# Patient Record
Sex: FEMALE
[De-identification: names, ages, dates, MRNs, and addresses within clinical notes are randomized per-mention and may not be internally consistent; named-entity substitution may affect disease eponyms.]

---

## 2024-08-08 ENCOUNTER — APPOINTMENT (OUTPATIENT)
Dept: OBGYN | Facility: CLINIC | Age: 29
End: 2024-08-08

## 2024-08-08 ENCOUNTER — LABORATORY RESULT (OUTPATIENT)
Age: 29
End: 2024-08-08

## 2024-08-08 PROBLEM — Z82.49 FAMILY HISTORY OF HYPERTENSION: Status: ACTIVE | Noted: 2024-08-08

## 2024-08-08 PROBLEM — Z86.19 HISTORY OF HERPES SIMPLEX TYPE 2 INFECTION: Status: RESOLVED | Noted: 2024-08-08 | Resolved: 2024-08-08

## 2024-08-08 PROBLEM — Z00.00 ENCOUNTER FOR PREVENTIVE HEALTH EXAMINATION: Status: ACTIVE | Noted: 2024-08-08

## 2024-08-08 PROBLEM — Z78.9 NON-SMOKER: Status: ACTIVE | Noted: 2024-08-08

## 2024-08-08 PROBLEM — Z12.4 CERVICAL CANCER SCREENING: Status: ACTIVE | Noted: 2024-08-08

## 2024-08-08 PROBLEM — Z80.3 FAMILY HISTORY OF MALIGNANT NEOPLASM OF FEMALE BREAST: Status: ACTIVE | Noted: 2024-08-08

## 2024-08-08 PROBLEM — N32.81 OVERACTIVE BLADDER: Status: RESOLVED | Noted: 2024-08-08 | Resolved: 2024-08-08

## 2024-08-08 PROBLEM — Z11.3 VENEREAL DISEASE SCREENING: Status: ACTIVE | Noted: 2024-08-08

## 2024-08-08 PROBLEM — Z30.42 ENCOUNTER FOR SURVEILLANCE OF INJECTABLE CONTRACEPTIVE: Status: ACTIVE | Noted: 2024-08-08

## 2024-08-08 PROBLEM — Z01.419 ENCOUNTER FOR GYNECOLOGICAL EXAMINATION WITHOUT ABNORMAL FINDING: Status: ACTIVE | Noted: 2024-08-08 | Resolved: 2024-08-22

## 2024-08-08 PROBLEM — Z87.19 HISTORY OF GASTROESOPHAGEAL REFLUX (GERD): Status: RESOLVED | Noted: 2024-08-08 | Resolved: 2024-08-08

## 2024-08-08 PROBLEM — Z78.9 CONSUMES ALCOHOL OCCASIONALLY: Status: ACTIVE | Noted: 2024-08-08

## 2024-08-08 PROBLEM — Z80.0 FAMILY HISTORY OF MALIGNANT NEOPLASM OF COLON: Status: ACTIVE | Noted: 2024-08-08

## 2024-08-08 PROBLEM — R87.619 ABNORMAL CERVICAL PAPANICOLAOU SMEAR, UNSPECIFIED ABNORMAL PAP FINDING: Status: ACTIVE | Noted: 2024-08-08

## 2024-08-08 PROCEDURE — 99385 PREV VISIT NEW AGE 18-39: CPT | Mod: 25

## 2024-08-08 PROCEDURE — 96372 THER/PROPH/DIAG INJ SC/IM: CPT

## 2024-08-08 NOTE — PHYSICAL EXAM
[Appropriately responsive] : appropriately responsive [Alert] : alert [No Acute Distress] : no acute distress [Regular Rate Rhythm] : regular rate rhythm [Soft] : soft [Non-tender] : non-tender [Non-distended] : non-distended [No HSM] : No HSM [No Lesions] : no lesions [No Mass] : no mass [Oriented x3] : oriented x3 [Examination Of The Breasts] : a normal appearance [Normal] : normal [No Masses] : no breast masses were palpable [FreeTextEntry5] : Respirations even, unlabored. no dyspnea [FreeTextEntry1] : Labia/Clitoris: Normal, no lesions. Vagina: Normal, no lesions visible or palpable. no abnormal discharge Cervix: Smooth, pink, no lesions. No cervical motion tenderness. PAP collected  Uterus: normal size and position mobile, nontender.  Adnexa: No palpable adnexal masses, nontender bilaterally.

## 2024-08-08 NOTE — HISTORY OF PRESENT ILLNESS
[N] : Patient denies prior pregnancies [Currently Active] : currently active [Men] : men [Yes] : Yes [DepoProvera] : uses depo-medroxyprogesterone [Y] : Patient is sexually active [Monogamous (Male Partner)] : is monogamous with a male partner [FreeTextEntry1] : 28 year old female is here for Annual gyn exam; patient is known to me from prior GYN practice. She uses Depo Provera for contraception and is happy with that form of BCM. Hx of abnormal PAP last year (LSIL) s/p colposcopy. She reports having a lesion/bump on her left gluteal fold last week, does not think it was a herpes lesion, but is not sure, she thinks it might have been an ingrown hair; states lesion has since healed, but she is requesting refill on Valtrex and Acyclovir just in case. Patient offers no acute GYN complaints.  [PapSmeardate] : 12/23 [TextBox_31] : LGSIL, HPV POSITIVE, COLPO  [LMPDate] : 07/20/24 [PGHxTotal] : 0 [FreeTextEntry3] : DEPO PROVERA

## 2024-08-08 NOTE — DISCUSSION/SUMMARY
[FreeTextEntry1] : Well appearing female is here for Annual gyn exam; uses depo Provera for contraception and is happy with that. Hx of HSV, desires Valtrex refill as well. Hx of abnormal pap; states she took HPV supplements (Papillex) since last year to help treat HPV.   - PAP done with co-testing and NG/GC - pt declines STI blood work at this time - Depo Provera given as requested, pt tolerated well - Discussed HPV and HSV  - Healthy diet/exercise/SBE advised - Safe sex/condom use recommended  RTO in 3 months for depo provera injection /  in 1 year for Annual gyn exam

## 2024-09-19 ENCOUNTER — APPOINTMENT (OUTPATIENT)
Dept: OBGYN | Facility: CLINIC | Age: 29
End: 2024-09-19
Payer: COMMERCIAL

## 2024-09-19 VITALS
SYSTOLIC BLOOD PRESSURE: 114 MMHG | DIASTOLIC BLOOD PRESSURE: 68 MMHG | BODY MASS INDEX: 21.52 KG/M2 | HEIGHT: 68 IN | WEIGHT: 142 LBS

## 2024-09-19 DIAGNOSIS — R87.611 ATYPICAL SQUAMOUS CELLS CANNOT EXCLUDE HIGH GRADE SQUAMOUS INTRAEPITHELIAL LESION ON CYTOLOGIC SMEAR OF CERVIX (ASC-H): ICD-10-CM

## 2024-09-19 DIAGNOSIS — R87.89 OTHER ABNORMAL FINDINGS IN SPECIMENS FROM FEMALE GENITAL ORGANS: ICD-10-CM

## 2024-09-19 LAB
HCG UR QL: NEGATIVE
QUALITY CONTROL: YES

## 2024-09-19 PROCEDURE — 57456 ENDOCERV CURETTAGE W/SCOPE: CPT

## 2024-09-19 PROCEDURE — 81025 URINE PREGNANCY TEST: CPT

## 2024-09-19 NOTE — PLAN
[FreeTextEntry1] : Well appearing female is here for colposcopy d/t recent abnormal PAP (ASC-H, HPV positive). Patient has hx of HPV, she was taking Papillex supplement x 6 months, she denies smoking, reports having had Gardasil vaccine. She has one male partner x 1.5 years.   colpo done  ECC obtained only post procedure precautions and instructions given reviewed HPV  RTO as directed and prn

## 2024-09-19 NOTE — PROCEDURE
[Colposcopy] : Colposcopy  [Time out performed] : Pre-procedure time out performed.  Patient's name, date of birth and procedure confirmed. [Consent Obtained] : Consent obtained [Risks] : risks [Benefits] : benefits [Patient] : patient [No Premedication] : no premedication [Colposcopy Adequate] : colposcopy adequate [SCI Fully Visualized] : SCI fully visualized [ECC Performed] : ECC performed [No Abnormalities] : no abnormalities [Hemostasis Obtained] : Hemostasis obtained [Tolerated Well] : the patient tolerated the procedure well [de-identified] : ASC-H , HPV positive  [de-identified] : no acetowhite or abnormal lesions seen on ectocervix using white and green filters [de-identified] : Endocervical curettage only [de-identified] : with applied pressure [de-identified] : Patient verbalized understanding of avoiding intercourse/penetration for 7-10 days after procedure. Patient is to expect intermittent uterine cramping for 1 day to 1-2 weeks. Patient may use Non-Steroidal Anti-Inflammatory Agents (NSAIDS) PRN for relief. Patient was instructed that she is to call the office if any abnormal bleeding or signs/symptoms of infection (i.e. fever/chills, severe abdominal cramping, abnormal or odorous vaginal discharge) or any additional problems arise.

## 2024-09-24 LAB — CORE LAB BIOPSY: NORMAL

## 2024-11-04 ENCOUNTER — APPOINTMENT (OUTPATIENT)
Dept: OBGYN | Facility: CLINIC | Age: 29
End: 2024-11-04

## 2024-11-04 VITALS
SYSTOLIC BLOOD PRESSURE: 120 MMHG | HEART RATE: 77 BPM | WEIGHT: 133 LBS | HEIGHT: 68 IN | OXYGEN SATURATION: 98 % | DIASTOLIC BLOOD PRESSURE: 80 MMHG | BODY MASS INDEX: 20.16 KG/M2

## 2024-11-04 DIAGNOSIS — Z30.42 ENCOUNTER FOR SURVEILLANCE OF INJECTABLE CONTRACEPTIVE: ICD-10-CM

## 2024-11-04 DIAGNOSIS — R87.611 ATYPICAL SQUAMOUS CELLS CANNOT EXCLUDE HIGH GRADE SQUAMOUS INTRAEPITHELIAL LESION ON CYTOLOGIC SMEAR OF CERVIX (ASC-H): ICD-10-CM

## 2024-11-04 DIAGNOSIS — R87.89 OTHER ABNORMAL FINDINGS IN SPECIMENS FROM FEMALE GENITAL ORGANS: ICD-10-CM

## 2024-11-04 LAB — HCG UR QL: NEGATIVE

## 2024-11-04 PROCEDURE — 57456 ENDOCERV CURETTAGE W/SCOPE: CPT

## 2024-11-04 PROCEDURE — 96372 THER/PROPH/DIAG INJ SC/IM: CPT | Mod: 59

## 2024-11-04 PROCEDURE — 81025 URINE PREGNANCY TEST: CPT

## 2024-11-04 RX ORDER — MEDROXYPROGESTERONE ACETATE 150 MG/ML
150 INJECTION, SUSPENSION INTRAMUSCULAR
Refills: 0 | Status: COMPLETED | OUTPATIENT
Start: 2024-11-04

## 2024-11-04 RX ADMIN — MEDROXYPROGESTERONE ACETATE 0 MG/ML: 150 INJECTION, SUSPENSION INTRAMUSCULAR at 00:00

## 2024-11-14 LAB — CORE LAB BIOPSY: NORMAL

## 2025-01-23 ENCOUNTER — APPOINTMENT (OUTPATIENT)
Dept: OBGYN | Facility: CLINIC | Age: 30
End: 2025-01-23
Payer: COMMERCIAL

## 2025-01-23 VITALS
DIASTOLIC BLOOD PRESSURE: 70 MMHG | HEIGHT: 68 IN | SYSTOLIC BLOOD PRESSURE: 100 MMHG | WEIGHT: 141 LBS | BODY MASS INDEX: 21.37 KG/M2

## 2025-01-23 DIAGNOSIS — Z30.42 ENCOUNTER FOR SURVEILLANCE OF INJECTABLE CONTRACEPTIVE: ICD-10-CM

## 2025-01-23 LAB
HCG UR QL: NEGATIVE
QUALITY CONTROL: YES

## 2025-01-23 PROCEDURE — 81025 URINE PREGNANCY TEST: CPT

## 2025-01-23 PROCEDURE — 96372 THER/PROPH/DIAG INJ SC/IM: CPT

## 2025-01-23 RX ORDER — MEDROXYPROGESTERONE ACETATE 150 MG/ML
150 INJECTION, SUSPENSION INTRAMUSCULAR
Refills: 0 | Status: COMPLETED | OUTPATIENT
Start: 2025-01-23

## 2025-01-23 RX ADMIN — MEDROXYPROGESTERONE ACETATE 0 MG/ML: 150 INJECTION, SUSPENSION INTRAMUSCULAR at 00:00

## 2025-04-11 ENCOUNTER — APPOINTMENT (OUTPATIENT)
Dept: OBGYN | Facility: CLINIC | Age: 30
End: 2025-04-11
Payer: COMMERCIAL

## 2025-04-11 VITALS
WEIGHT: 140 LBS | BODY MASS INDEX: 21.22 KG/M2 | DIASTOLIC BLOOD PRESSURE: 70 MMHG | SYSTOLIC BLOOD PRESSURE: 100 MMHG | HEIGHT: 68 IN

## 2025-04-11 DIAGNOSIS — Z30.42 ENCOUNTER FOR SURVEILLANCE OF INJECTABLE CONTRACEPTIVE: ICD-10-CM

## 2025-04-11 LAB
HCG UR QL: NEGATIVE
QUALITY CONTROL: YES

## 2025-04-11 PROCEDURE — 96372 THER/PROPH/DIAG INJ SC/IM: CPT

## 2025-04-11 PROCEDURE — 81025 URINE PREGNANCY TEST: CPT

## 2025-04-11 RX ORDER — MEDROXYPROGESTERONE ACETATE 150 MG/ML
150 INJECTION, SUSPENSION INTRAMUSCULAR
Refills: 0 | Status: COMPLETED | OUTPATIENT
Start: 2025-04-11

## 2025-04-11 RX ADMIN — MEDROXYPROGESTERONE ACETATE 0 MG/ML: 150 INJECTION, SUSPENSION INTRAMUSCULAR at 00:00

## 2025-06-30 ENCOUNTER — APPOINTMENT (OUTPATIENT)
Dept: OBGYN | Facility: CLINIC | Age: 30
End: 2025-06-30